# Patient Record
Sex: MALE | Race: WHITE | NOT HISPANIC OR LATINO | Employment: FULL TIME | URBAN - METROPOLITAN AREA
[De-identification: names, ages, dates, MRNs, and addresses within clinical notes are randomized per-mention and may not be internally consistent; named-entity substitution may affect disease eponyms.]

---

## 2022-04-21 ENCOUNTER — TRANSFERRED RECORDS (OUTPATIENT)
Dept: HEALTH INFORMATION MANAGEMENT | Facility: CLINIC | Age: 52
End: 2022-04-21

## 2022-06-09 ENCOUNTER — TRANSFERRED RECORDS (OUTPATIENT)
Dept: HEALTH INFORMATION MANAGEMENT | Facility: CLINIC | Age: 52
End: 2022-06-09

## 2022-07-20 NOTE — TELEPHONE ENCOUNTER
Action 7/20/22 MV 9.16am   Action Taken 1) records + imaging request faxed to [facility]  2) imaging request faxed to [facility]    7/21/22 MV 11am  [facility] + [facility] images resolved in PACS ; [facility] images received and sent to scanning         RECORDS RECEIVED FROM: self   REASON FOR VISIT: MG   Date of Appt: 10/10/22   NOTES (FOR ALL VISITS) STATUS DETAILS   OFFICE NOTE from other specialist Received Lizabeth [facility] @ [facility]:  6/9/22  5/13/22  4/15/22    Dr Aruna Smart @ [facility] (neuro-ophth):  5/12/22    [place] [facility] @ [facility]:  5/10/22   ER Care Everywhere Urgent Care:  3/28/22   MEDICATION LIST Care Everywhere    IMAGING  (FOR ALL VISITS)     MRI (HEAD, NECK, SPINE) Received [facility]:  MRI Brain 4/21/22  MRA Cervical Vessels 4/21/22  MRI Face/Orbits 4/21/22  MRI Cervical Spine 4/21/22  MRA Head 4/21/22   CT (HEAD, NECK, SPINE) Received [facility]:  CTA Head Neck Carotid 3/28/22

## 2022-07-21 ENCOUNTER — TRANSFERRED RECORDS (OUTPATIENT)
Dept: HEALTH INFORMATION MANAGEMENT | Facility: CLINIC | Age: 52
End: 2022-07-21

## 2022-08-06 ENCOUNTER — HEALTH MAINTENANCE LETTER (OUTPATIENT)
Age: 52
End: 2022-08-06

## 2022-10-10 ENCOUNTER — PRE VISIT (OUTPATIENT)
Dept: NEUROLOGY | Facility: CLINIC | Age: 52
End: 2022-10-10

## 2022-10-22 ENCOUNTER — HEALTH MAINTENANCE LETTER (OUTPATIENT)
Age: 52
End: 2022-10-22

## 2023-08-27 ENCOUNTER — HEALTH MAINTENANCE LETTER (OUTPATIENT)
Age: 53
End: 2023-08-27